# Patient Record
Sex: MALE | Race: WHITE | Employment: UNEMPLOYED | URBAN - METROPOLITAN AREA
[De-identification: names, ages, dates, MRNs, and addresses within clinical notes are randomized per-mention and may not be internally consistent; named-entity substitution may affect disease eponyms.]

---

## 2022-01-31 ENCOUNTER — HOSPITAL ENCOUNTER (OUTPATIENT)
Dept: WOUND CARE | Age: 47
Discharge: HOME | End: 2022-01-31
Payer: COMMERCIAL

## 2022-01-31 VITALS
SYSTOLIC BLOOD PRESSURE: 135 MMHG | TEMPERATURE: 96.4 F | DIASTOLIC BLOOD PRESSURE: 88 MMHG | RESPIRATION RATE: 16 BRPM | HEART RATE: 80 BPM

## 2022-01-31 DIAGNOSIS — L97.512 RIGHT FOOT ULCER, WITH FAT LAYER EXPOSED (HCC): Primary | ICD-10-CM

## 2022-01-31 PROCEDURE — 11042 DBRDMT SUBQ TIS 1ST 20SQCM/<: CPT

## 2022-01-31 RX ORDER — LIDOCAINE HYDROCHLORIDE 20 MG/ML
JELLY TOPICAL ONCE
Status: CANCELLED | OUTPATIENT
Start: 2022-01-31 | End: 2022-01-31

## 2022-01-31 RX ORDER — LIDOCAINE HYDROCHLORIDE 40 MG/ML
SOLUTION TOPICAL ONCE
Status: CANCELLED | OUTPATIENT
Start: 2022-01-31 | End: 2022-01-31

## 2022-01-31 RX ORDER — GENTAMICIN SULFATE 1 MG/G
OINTMENT TOPICAL ONCE
Status: CANCELLED | OUTPATIENT
Start: 2022-01-31 | End: 2022-01-31

## 2022-01-31 RX ORDER — CLOBETASOL PROPIONATE 0.5 MG/G
OINTMENT TOPICAL ONCE
Status: CANCELLED | OUTPATIENT
Start: 2022-01-31 | End: 2022-01-31

## 2022-01-31 RX ORDER — GINSENG 100 MG
CAPSULE ORAL ONCE
Status: CANCELLED | OUTPATIENT
Start: 2022-01-31 | End: 2022-01-31

## 2022-01-31 RX ORDER — LIDOCAINE 40 MG/G
CREAM TOPICAL ONCE
Status: CANCELLED | OUTPATIENT
Start: 2022-01-31 | End: 2022-01-31

## 2022-01-31 RX ORDER — LIDOCAINE 50 MG/G
OINTMENT TOPICAL ONCE
Status: CANCELLED | OUTPATIENT
Start: 2022-01-31 | End: 2022-01-31

## 2022-01-31 RX ORDER — EMPAGLIFLOZIN 25 MG/1
25 TABLET, FILM COATED ORAL DAILY
COMMUNITY

## 2022-01-31 RX ORDER — LIDOCAINE 40 MG/G
CREAM TOPICAL ONCE
Status: DISCONTINUED | OUTPATIENT
Start: 2022-01-31 | End: 2022-02-01 | Stop reason: HOSPADM

## 2022-01-31 RX ORDER — BETAMETHASONE DIPROPIONATE 0.05 %
OINTMENT (GRAM) TOPICAL ONCE
Status: CANCELLED | OUTPATIENT
Start: 2022-01-31 | End: 2022-01-31

## 2022-01-31 RX ORDER — BACITRACIN ZINC AND POLYMYXIN B SULFATE 500; 1000 [USP'U]/G; [USP'U]/G
OINTMENT TOPICAL ONCE
Status: CANCELLED | OUTPATIENT
Start: 2022-01-31 | End: 2022-01-31

## 2022-01-31 RX ORDER — INSULIN DETEMIR 100 [IU]/ML
INJECTION, SOLUTION SUBCUTANEOUS NIGHTLY
COMMUNITY

## 2022-01-31 RX ORDER — BACITRACIN, NEOMYCIN, POLYMYXIN B 400; 3.5; 5 [USP'U]/G; MG/G; [USP'U]/G
OINTMENT TOPICAL ONCE
Status: CANCELLED | OUTPATIENT
Start: 2022-01-31 | End: 2022-01-31

## 2022-01-31 NOTE — PLAN OF CARE
Discharge instructions given. Patient verbalized understanding. Return to [facility] in 2 week(s).   Called/faxed orders to [facility]

## 2022-01-31 NOTE — PROGRESS NOTES
Javi Mike  Progress Note and Procedure Note      Henri Clements  AGE: 42 y.o. GENDER: male  : 1975  TODAY'S DATE:  2022    Subjective:     Chief Complaint   Patient presents with    Wound Check     Right foot wound - diabetic - 1 month         HISTORY of PRESENT ILLNESS HPI     Henri Clements is a 42 y.o. male who presents today for wound evaluation. History of Wound: R foot ulcer. Pt has charcot foot with mid arch collapse. He was doing well ultil he had to run after dog barefoot and then wound started.    Wound Pain:  none  Severity:  0 / 10   Wound Type:  diabetic  Modifying Factors:  edema, diabetes, chronic pressure, shear force and obesity  Associated Signs/Symptoms:  edema and drainage        PAST MEDICAL HISTORY        Diagnosis Date    Diabetes mellitus ([address]     Hypertension     Obesity     SVT (supraventricular tachycardia) (HCC)     WPW syndrome        PAST SURGICAL HISTORY    Past Surgical History:   Procedure Laterality Date    ABLATION OF DYSRHYTHMIC FOCUS  12    posterior, septal accessory pathway    FOOT SURGERY      BILAT INGROWN TOENAILS       FAMILY HISTORY    Family History   Problem Relation Age of Onset    High Blood Pressure Mother     Cancer Maternal Grandmother         bone       SOCIAL HISTORY    Social History     Tobacco Use    Smoking status: Former Smoker     Packs/day: 1.00     Years: 15.00     Pack years: 15.00    Smokeless tobacco: Former User     Quit date: 3/8/2012   Substance Use Topics    Alcohol use: No    Drug use: No       ALLERGIES    Allergies   Allergen Reactions    Lactose      Lactose intolerance       MEDICATIONS    Current Outpatient Medications on File Prior to Encounter   Medication Sig Dispense Refill    insulin detemir (LEVEMIR) 100 UNIT/ML injection vial Inject into the skin nightly      empagliflozin (JARDIANCE) 25 MG tablet Take 25 mg by mouth daily      lisinopril (PRINIVIL;ZESTRIL) 40 MG tablet Take 1 tablet by mouth daily. 90 tablet 1    metoprolol (LOPRESSOR) 50 MG tablet Take 1 tablet by mouth 2 times daily. 180 tablet 1    simvastatin (ZOCOR) 20 MG tablet Take 1 tablet by mouth nightly. 90 tablet 1    aspirin 81 MG chewable tablet Take 1 tablet by mouth daily. 30 tablet 0     No current facility-administered medications on file prior to encounter. REVIEW OF SYSTEMS    Pertinent items are noted in HPI. Objective:      /88   Pulse 80   Temp 96.4 °F (35.8 °C)   Resp 16     PHYSICAL EXAM    General Appearance: alert and oriented to person, place and time, well-developed and well-nourished, in no acute distress  Skin: warm and dry, no rash or erythema and ulcer plantar R foot granular and no probe to bone. Callused edges. Cardiovascular: normal rate and intact distal pulses  Extremities: no cyanosis, no clubbing and edema noted to R foot. Musculoskeletal: no tender joints, no crepitus, no trigger point or muscular tenderness and charcot foot with mid foot collapse noted R foot. L hallux amputation noted. Neurologic: gait and coordination normal, speech normal and decreased sensation b/l LE. Assessment:     Patient Active Problem List   Diagnosis    WPW (Agatha-Parkinson-White syndrome)    Morbid obesity ([address]    Polycythemia    Diabetes mellitus [facility]    Hypertension       Procedure Note    Performed by: Brando Florez DPM    Consent obtained: Yes    Time out taken:  Yes    Pain Control: Anesthetic  Anesthetic: 4% Lidocaine Cream     Debridement:Excisional Debridement    Using curette the wound was sharply debrided    down through and including the removal of epidermis, dermis and subcutaneous tissue.         Devitalized Tissue Debrided:  fibrin, biofilm, slough and callus    Pre Debridement Measurements:  Are located in the Wound Documentation Flow Sheet    Wound #: 1     Post  Debridement Measurements:  Incision 04/17/12 Groin Right (Active)   Number of days: 2696 Incision 04/17/12 Groin Left (Active)   Number of days: 3183       Wound 01/31/22 Foot Right;Plantar #1 (Active)   Wound Image   01/31/22 1452   Wound Etiology Diabetic 01/31/22 1452   Wound Cleansed Cleansed with saline 01/31/22 1504   Offloading for Diabetic Foot Ulcers Diabetic shoes/inserts 01/31/22 1452   Wound Length (cm) 3.4 cm 01/31/22 1452   Wound Width (cm) 2.1 cm 01/31/22 1452   Wound Depth (cm) 0.1 cm 01/31/22 1452   Wound Surface Area (cm^2) 7.14 cm^2 01/31/22 1452   Wound Volume (cm^3) 0.714 cm^3 01/31/22 1452   Post-Procedure Length (cm) 3.4 cm 01/31/22 1504   Post-Procedure Width (cm) 2 cm 01/31/22 1504   Post-Procedure Depth (cm) 2.1 cm 01/31/22 1504   Post-Procedure Surface Area (cm^2) 6.8 cm^2 01/31/22 1504   Post-Procedure Volume (cm^3) 14.28 cm^3 01/31/22 1504   Wound Assessment Bleeding 01/31/22 1504   Drainage Amount Moderate 01/31/22 1504   Drainage Description Serosanguinous 01/31/22 1452   Odor None 01/31/22 1452   Santa-wound Assessment Intact 01/31/22 1452   Margins Attached edges; Defined edges 01/31/22 1452   Number of days: 0           Total Surface Area Debrided:  7.14 sq cm     Percentage of wound debrided 100%    Bleeding:  Minimal    Hemostasis Achieved:  by pressure    Procedural Pain:  0  / 10     Post Procedural Pain:  0 / 10     Response to treatment:  Well tolerated by patient. Plan:     The nature of the patient's condition was explained in depth. The patient was informed that their compliance to the treatment plan is paramount to successful healing and prevention of further ulceration and/or infection     Discharge Treatment       Pt examined and evaluated  Wound debrided full thickness with curette. Dress with collagen and DSD  Discussed importance of offloading and avoiding barefoot walking. F/u 2 weeks   Pending progress discuss TCC or grafting in future.      Written Patient Discharge Instructions Given            Electronically signed by Bhavesh Velazquez

## 2022-01-31 NOTE — PROGRESS NOTES
[address] [facility],[address]:      [address] f: [phone] f: [phone] p: [phone] [email or web address]     Ordering Center: Sarai Morgan [address]  [facility] [address]  [phone]  Dept: [phone]   Fax# [phone]    Patient Information:      Hari Sidhu  [address] [facility] [address]   [phone]   : 1975  AGE: 42 y.o. GENDER: male   TODAYS DATE:  2022    Insurance:      PRIMARY INSURANCE:  Plan: BCBS - [place] PPO  Coverage: BCBS  Effective Date: 2022  Group Number: [unfilled]  Subscriber Number: [id number] - (BX Traditional)    Payor/Plan Subscr  Sex Relation Sub. Ins. ID Effective Group Num   1.  4002 Gemini Stephenson -* DARRENROCIO 1975 Male Self [id number] 22 [id number]                                   [address]         Patient Wound Information:     Additional ICD-10 Codes:     Patient Active Problem List   Diagnosis Code    WPW (Agatha-Parkinson-White syndrome) I45.6    Morbid obesity ([facility] E66.01    Polycythemia D75.1    Diabetes mellitus ([facility] E11.9    Hypertension I10       WOUNDS REQUIRING DRESSING SUPPLIES:     Incision 12 Groin Right (Active)   Number of days: 4395       Incision 12 Groin Left (Active)   Number of days: 0590       Wound 22 Foot Right;Plantar #1 (Active)   Wound Image   22 1452   Wound Etiology Diabetic 22 1452   Wound Cleansed Cleansed with saline 22 1504   Offloading for Diabetic Foot Ulcers Diabetic shoes/inserts 22 1452   Wound Length (cm) 3.4 cm 22 1452   Wound Width (cm) 2.1 cm 22 1452   Wound Depth (cm) 0.1 cm 22 1452   Wound Surface Area (cm^2) 7.14 cm^2 22 1452   Wound Volume (cm^3) 0.714 cm^3 22 1452   Post-Procedure Length (cm) 3.4 cm 22 1504   Post-Procedure Width (cm) 2 cm 22 1504   Post-Procedure Depth (cm) 2.1 cm 22 1504   Post-Procedure Surface Area (cm^2) 6.8 cm^2 01/31/22 1504   Post-Procedure Volume (cm^3) 14.28 cm^3 01/31/22 1504   Wound Assessment Bleeding 01/31/22 1504   Drainage Amount Moderate 01/31/22 1504   Drainage Description Serosanguinous 01/31/22 1452   Odor None 01/31/22 1452   Santa-wound Assessment Intact 01/31/22 1452   Margins Attached edges; Defined edges 01/31/22 1452   Number of days: 0          Supplies Requested :      WOUND #: 1   PRIMARY DRESSING:    Other: Gigi Colonel and Secure with: 4X4 non woven gauze pad     FREQUENCY OF DRESSING CHANGES:  Daily    Wound Thickness [x] Full   []Partial       Patient Wound(s) Debrided: [x] Yes   [] No    Debridement Date: 1/31/2022    Debribement Type: Excisional/Sharp    ADDITIONAL ITEMS:  [] Gloves Small  [x] Gloves Medium [] Gloves Large [] Gloves [place] Chess  [] Paper Tape 1\" [] Paper Tape 2\" [] Paper Tape 3\"  [x] Medipore Tape 3\"  [x] Saline  [] Skin Prep   [] Adhesive Remover   [] Cotton Tip Applicators  [] Tubular Stocking   [] Size E  [] Size G  [] Other:    Patient currently being seen by Home Health: [] Yes   [x] No    Duration for needed supplies:  [x]15  []30  []60  []90 Days    Provider Information:      PROVIDER'S NAME/NPI  Mary Allen DPM NPI: [id number]   I give permission to coordinate the care for this patient

## 2022-02-14 ENCOUNTER — HOSPITAL ENCOUNTER (OUTPATIENT)
Dept: WOUND CARE | Age: 47
Discharge: HOME | End: 2022-02-14
Payer: COMMERCIAL

## 2022-02-14 VITALS — SYSTOLIC BLOOD PRESSURE: 130 MMHG | HEART RATE: 87 BPM | RESPIRATION RATE: 16 BRPM | DIASTOLIC BLOOD PRESSURE: 87 MMHG

## 2022-02-14 DIAGNOSIS — L97.512 RIGHT FOOT ULCER, WITH FAT LAYER EXPOSED (HCC): Primary | ICD-10-CM

## 2022-02-14 PROCEDURE — 11042 DBRDMT SUBQ TIS 1ST 20SQCM/<: CPT

## 2022-02-14 RX ORDER — GENTAMICIN SULFATE 1 MG/G
OINTMENT TOPICAL ONCE
Status: CANCELLED | OUTPATIENT
Start: 2022-02-14 | End: 2022-02-14

## 2022-02-14 RX ORDER — BACITRACIN ZINC AND POLYMYXIN B SULFATE 500; 1000 [USP'U]/G; [USP'U]/G
OINTMENT TOPICAL ONCE
Status: CANCELLED | OUTPATIENT
Start: 2022-02-14 | End: 2022-02-14

## 2022-02-14 RX ORDER — LIDOCAINE HYDROCHLORIDE 20 MG/ML
JELLY TOPICAL ONCE
Status: CANCELLED | OUTPATIENT
Start: 2022-02-14 | End: 2022-02-14

## 2022-02-14 RX ORDER — CLOBETASOL PROPIONATE 0.5 MG/G
OINTMENT TOPICAL ONCE
Status: CANCELLED | OUTPATIENT
Start: 2022-02-14 | End: 2022-02-14

## 2022-02-14 RX ORDER — BACITRACIN, NEOMYCIN, POLYMYXIN B 400; 3.5; 5 [USP'U]/G; MG/G; [USP'U]/G
OINTMENT TOPICAL ONCE
Status: CANCELLED | OUTPATIENT
Start: 2022-02-14 | End: 2022-02-14

## 2022-02-14 RX ORDER — LIDOCAINE 50 MG/G
OINTMENT TOPICAL ONCE
Status: CANCELLED | OUTPATIENT
Start: 2022-02-14 | End: 2022-02-14

## 2022-02-14 RX ORDER — BETAMETHASONE DIPROPIONATE 0.05 %
OINTMENT (GRAM) TOPICAL ONCE
Status: CANCELLED | OUTPATIENT
Start: 2022-02-14 | End: 2022-02-14

## 2022-02-14 RX ORDER — GINSENG 100 MG
CAPSULE ORAL ONCE
Status: CANCELLED | OUTPATIENT
Start: 2022-02-14 | End: 2022-02-14

## 2022-02-14 RX ORDER — LIDOCAINE 40 MG/G
CREAM TOPICAL ONCE
Status: DISCONTINUED | OUTPATIENT
Start: 2022-02-14 | End: 2022-02-15 | Stop reason: HOSPADM

## 2022-02-14 RX ORDER — LIDOCAINE 40 MG/G
CREAM TOPICAL ONCE
Status: CANCELLED | OUTPATIENT
Start: 2022-02-14 | End: 2022-02-14

## 2022-02-14 RX ORDER — LIDOCAINE HYDROCHLORIDE 40 MG/ML
SOLUTION TOPICAL ONCE
Status: CANCELLED | OUTPATIENT
Start: 2022-02-14 | End: 2022-02-14

## 2022-02-14 NOTE — PLAN OF CARE
Discharge instructions given. Patient verbalized understanding. Return to [address] [place] [address],[address] in 1 week(s).

## 2022-02-14 NOTE — PROGRESS NOTES
Javi Mike  Progress Note and Procedure Note      Krishna Francisco  AGE: 42 y.o. GENDER: male  : 1975  TODAY'S DATE:  2022    Subjective:     Chief Complaint   Patient presents with    Wound Check     Follow up right foot wound         HISTORY of PRESENT ILLNESS HPI     Krishna Francisco is a 42 y.o. male who presents today for wound evaluation. History of Wound: R foot ulcer. Pt has charcot foot with mid arch collapse. He was doing well ultil he had to run after dog barefoot and then wound started. Pt was unable to get collagen past 2 weeks.    Wound Pain:  none  Severity:  0 / 10   Wound Type:  diabetic  Modifying Factors:  edema, diabetes, chronic pressure and shear force  Associated Signs/Symptoms:  edema and drainage        PAST MEDICAL HISTORY        Diagnosis Date    Diabetes mellitus ([address]     Hypertension     Obesity     SVT (supraventricular tachycardia) (HCC)     WPW syndrome        PAST SURGICAL HISTORY    Past Surgical History:   Procedure Laterality Date    ABLATION OF DYSRHYTHMIC FOCUS  12    posterior, septal accessory pathway    FOOT SURGERY      BILAT INGROWN TOENAILS       FAMILY HISTORY    Family History   Problem Relation Age of Onset    High Blood Pressure Mother     Cancer Maternal Grandmother         bone       SOCIAL HISTORY    Social History     Tobacco Use    Smoking status: Former Smoker     Packs/day: 1.00     Years: 15.00     Pack years: 15.00    Smokeless tobacco: Former User     Quit date: 3/8/2012   Substance Use Topics    Alcohol use: No    Drug use: No       ALLERGIES    Allergies   Allergen Reactions    Lactose      Lactose intolerance       MEDICATIONS    Current Outpatient Medications on File Prior to Encounter   Medication Sig Dispense Refill    insulin detemir (LEVEMIR) 100 UNIT/ML injection vial Inject into the skin nightly      empagliflozin (JARDIANCE) 25 MG tablet Take 25 mg by mouth daily      lisinopril (PRINIVIL;ZESTRIL) 40 MG tablet Take 1 tablet by mouth daily. 90 tablet 1    metoprolol (LOPRESSOR) 50 MG tablet Take 1 tablet by mouth 2 times daily. 180 tablet 1    simvastatin (ZOCOR) 20 MG tablet Take 1 tablet by mouth nightly. 90 tablet 1    aspirin 81 MG chewable tablet Take 1 tablet by mouth daily. 30 tablet 0     No current facility-administered medications on file prior to encounter. REVIEW OF SYSTEMS    Pertinent items are noted in HPI. Objective:      /87   Pulse 87   Resp 16     PHYSICAL EXAM    General Appearance: alert and oriented to person, place and time, well-developed and well-nourished, in no acute distress  Skin: warm and dry, no rash or erythema and ulcer plantar R foot granular and no probe to bone. Callused edges. Cardiovascular: normal rate and intact distal pulses  Extremities: no cyanosis, no clubbing and edema noted to R foot. Musculoskeletal: no tender joints, no crepitus, no trigger point or muscular tenderness and charcot foot with mid foot collapse noted R foot. L hallux amputation noted. Neurologic: gait and coordination normal, speech normal and decreased sensation b/l LE. Assessment:     Patient Active Problem List   Diagnosis    WPW (Agatha-Parkinson-White syndrome)    Morbid obesity ([address]    Polycythemia    Diabetes mellitus ([address]    Hypertension    Right foot ulcer, with fat layer exposed ([facility]       Procedure Note    Performed by: Nicanor Hernandez DPM    Consent obtained: Yes    Time out taken:  Yes    Pain Control: Anesthetic  Anesthetic: 4% Lidocaine Cream     Debridement:Excisional Debridement    Using curette the wound was sharply debrided    down through and including the removal of epidermis, dermis and subcutaneous tissue.         Devitalized Tissue Debrided:  fibrin, biofilm, slough and callus    Pre Debridement Measurements:  Are located in the Wound Documentation Flow Sheet    Wound #: 1     Post  Debridement Measurements:  Wound 01/31/22 Foot Right;Plantar #1 (Active)   Wound Image   01/31/22 1452   Wound Etiology Diabetic 02/14/22 1525   Wound Cleansed Cleansed with saline 02/14/22 1525   Dressing/Treatment Dry dressing;Triad hydro/zinc oxide-based hydrophilic paste; Other (comment) 01/31/22 1504   Offloading for Diabetic Foot Ulcers Diabetic shoes/inserts 02/14/22 1525   Wound Length (cm) 2 cm 02/14/22 1525   Wound Width (cm) 1.3 cm 02/14/22 1525   Wound Depth (cm) 0.1 cm 02/14/22 1525   Wound Surface Area (cm^2) 2.6 cm^2 02/14/22 1525   Change in Wound Size % (l*w) 63.59 02/14/22 1525   Wound Volume (cm^3) 0.26 cm^3 02/14/22 1525   Wound Healing % 64 02/14/22 1525   Post-Procedure Length (cm) 2 cm 02/14/22 1531   Post-Procedure Width (cm) 1.3 cm 02/14/22 1531   Post-Procedure Depth (cm) 0.1 cm 02/14/22 1531   Post-Procedure Surface Area (cm^2) 2.6 cm^2 02/14/22 1531   Post-Procedure Volume (cm^3) 0.26 cm^3 02/14/22 1531   Wound Assessment Bleeding 02/14/22 1531   Drainage Amount Moderate 02/14/22 1531   Drainage Description Serosanguinous 02/14/22 1525   Odor None 02/14/22 1525   Santa-wound Assessment Intact 02/14/22 1525   Margins Attached edges; Defined edges 02/14/22 1525   Number of days: 14           Total Surface Area Debrided:  2.6 sq cm     Percentage of wound debrided 100%    Bleeding:  Minimal    Hemostasis Achieved:  by pressure    Procedural Pain:  0  / 10     Post Procedural Pain:  0 / 10     Response to treatment:  Well tolerated by patient. Plan:     The nature of the patient's condition was explained in depth. The patient was informed that their compliance to the treatment plan is paramount to successful healing and prevention of further ulceration and/or infection     Discharge Treatment       Pt examined and evaluated  Wound debrided full thickness with curette. Dress with collagen and DSD  Discussed importance of offloading and avoiding barefoot walking.    F/u 2 weeks   Pending progress discuss TCC or grafting in future.      Written Patient Discharge Instructions Given            Electronically signed by Isaiah Raya DPM on 2/14/2022 at 3:32 PM

## 2022-02-28 ENCOUNTER — HOSPITAL ENCOUNTER (OUTPATIENT)
Dept: WOUND CARE | Age: 47
Discharge: HOME | End: 2022-02-28
Payer: COMMERCIAL

## 2022-02-28 VITALS — DIASTOLIC BLOOD PRESSURE: 94 MMHG | SYSTOLIC BLOOD PRESSURE: 149 MMHG | RESPIRATION RATE: 16 BRPM | HEART RATE: 76 BPM

## 2022-02-28 DIAGNOSIS — L97.512 RIGHT FOOT ULCER, WITH FAT LAYER EXPOSED (HCC): Primary | ICD-10-CM

## 2022-02-28 PROCEDURE — 11042 DBRDMT SUBQ TIS 1ST 20SQCM/<: CPT

## 2022-02-28 RX ORDER — LIDOCAINE HYDROCHLORIDE 40 MG/ML
SOLUTION TOPICAL ONCE
OUTPATIENT
Start: 2022-02-28 | End: 2022-02-28

## 2022-02-28 RX ORDER — BACITRACIN, NEOMYCIN, POLYMYXIN B 400; 3.5; 5 [USP'U]/G; MG/G; [USP'U]/G
OINTMENT TOPICAL ONCE
OUTPATIENT
Start: 2022-02-28 | End: 2022-02-28

## 2022-02-28 RX ORDER — CLOBETASOL PROPIONATE 0.5 MG/G
OINTMENT TOPICAL ONCE
OUTPATIENT
Start: 2022-02-28 | End: 2022-02-28

## 2022-02-28 RX ORDER — LIDOCAINE 50 MG/G
OINTMENT TOPICAL ONCE
OUTPATIENT
Start: 2022-02-28 | End: 2022-02-28

## 2022-02-28 RX ORDER — LIDOCAINE HYDROCHLORIDE 20 MG/ML
JELLY TOPICAL ONCE
OUTPATIENT
Start: 2022-02-28 | End: 2022-02-28

## 2022-02-28 RX ORDER — LIDOCAINE 40 MG/G
CREAM TOPICAL ONCE
Status: CANCELLED | OUTPATIENT
Start: 2022-02-28 | End: 2022-02-28

## 2022-02-28 RX ORDER — GINSENG 100 MG
CAPSULE ORAL ONCE
OUTPATIENT
Start: 2022-02-28 | End: 2022-02-28

## 2022-02-28 RX ORDER — BACITRACIN ZINC AND POLYMYXIN B SULFATE 500; 1000 [USP'U]/G; [USP'U]/G
OINTMENT TOPICAL ONCE
OUTPATIENT
Start: 2022-02-28 | End: 2022-02-28

## 2022-02-28 RX ORDER — BETAMETHASONE DIPROPIONATE 0.05 %
OINTMENT (GRAM) TOPICAL ONCE
OUTPATIENT
Start: 2022-02-28 | End: 2022-02-28

## 2022-02-28 RX ORDER — GENTAMICIN SULFATE 1 MG/G
OINTMENT TOPICAL ONCE
OUTPATIENT
Start: 2022-02-28 | End: 2022-02-28

## 2022-02-28 RX ORDER — LIDOCAINE 40 MG/G
CREAM TOPICAL ONCE
Status: DISCONTINUED | OUTPATIENT
Start: 2022-02-28 | End: 2022-03-01 | Stop reason: HOSPADM

## 2022-02-28 NOTE — PLAN OF CARE
Discharge instructions given. Patient verbalized understanding. Return to [address] [place] [address],[address] in 2 week(s). Called/faxed orders to [facility].

## 2022-02-28 NOTE — PLAN OF CARE
[address] [facility],[address]:      [address] f: [phone] f: [phone] p: [phone] [email or web address]     Ordering Center: Sarai Morgan [address]  [facility] [address]  [phone]  Dept: [phone]   Fax# [phone]    Patient Information:      Edith Larkin [facility] [address]   [phone]   : 1975  AGE: 42 y.o. GENDER: male   TODAYS DATE:  2022    Insurance:      PRIMARY INSURANCE:  Plan: BCBS - [place] PPO  Coverage: BCBS  Effective Date: 2022  Group Number: [unfilled]  Subscriber Number: [id number] - (BX Traditional)    Payor/Plan Subscr  Sex Relation Sub. Ins. ID Effective Group Num   1. 4002 Gemini Stephenson -* ROCIO BANKS 1975 Male Self [id number] 22 [id number]                                   [address]         Patient Wound Information:     Additional ICD-10 Codes:     Patient Active Problem List   Diagnosis Code    WPW (Agatha-Parkinson-White syndrome) I45.6    Morbid obesity ([address] E66.01    Polycythemia D75.1    Diabetes mellitus ([address] E11.9    Hypertension I10    Right foot ulcer, with fat layer exposed ([address] L97.512       WOUNDS REQUIRING DRESSING SUPPLIES:     Wound 22 Foot Right;Plantar #1 (Active)   Wound Image   22 1452   Wound Etiology Diabetic 22 1450   Wound Cleansed Cleansed with saline 22 1450   Dressing/Treatment Dry dressing;Triad hydro/zinc oxide-based hydrophilic paste; Other (comment) 22 1504   Offloading for Diabetic Foot Ulcers Diabetic shoes/inserts 22 1450   Wound Length (cm) 3 cm 22 1450   Wound Width (cm) 2.4 cm 22 1450   Wound Depth (cm) 0.1 cm 22 1450   Wound Surface Area (cm^2) 7.2 cm^2 22 1450   Change in Wound Size % (l*w) -0.84 22 1450   Wound Volume (cm^3) 0.72 cm^3 22 1450   Wound Healing % -1 22 1450   Post-Procedure Length (cm) 3 cm 22 1512 Post-Procedure Width (cm) 2.4 cm 02/28/22 1512   Post-Procedure Depth (cm) 0.1 cm 02/28/22 1512   Post-Procedure Surface Area (cm^2) 7.2 cm^2 02/28/22 1512   Post-Procedure Volume (cm^3) 0.72 cm^3 02/28/22 1512   Wound Assessment Bleeding 02/28/22 1512   Drainage Amount Moderate 02/28/22 1512   Drainage Description Serosanguinous 02/28/22 1450   Odor None 02/28/22 1450   Santa-wound Assessment Hyperkeratosis (callous) 02/28/22 1450   Margins Attached edges; Defined edges 02/28/22 1450   Number of days: 28          Supplies Requested :      WOUND #: 1   PRIMARY DRESSING:    Collagen    Cover and Secure with: Gauze pad     FREQUENCY OF DRESSING CHANGES:  Daily    Wound Thickness [x] Full   []Partial         Patient Wound(s) Debrided: [] Yes   [] No    Debridement Date: 2/28/2022    Debribement Type: Excisional/Sharp    ADDITIONAL ITEMS:  [] Gloves Small  [x] Gloves Medium [x] Gloves Large [] Gloves Nicole Sindy  [] Paper Tape 1\" [] Paper Tape 2\" [] Paper Tape 3\"  [x] Medipore Tape 3\"  [x] Saline  [] Skin Prep   [] Adhesive Remover   [] Cotton Tip Applicators  [] Tubular Stocking   [] Size E  [] Size G  [] Other:    Patient currently being seen by Home Health: [] Yes   [x] No    Duration for needed supplies:  []15  [x]30  []60  []90 Days    Provider Information:      PROVIDER'S NAME/NPI  Trish Tracey DPM NPI: [id number]    I give permission to coordinate the care for this patient

## 2022-03-14 ENCOUNTER — HOSPITAL ENCOUNTER (OUTPATIENT)
Dept: WOUND CARE | Age: 47
Discharge: HOME | End: 2022-03-14
Payer: COMMERCIAL

## 2022-03-14 VITALS
RESPIRATION RATE: 16 BRPM | SYSTOLIC BLOOD PRESSURE: 141 MMHG | TEMPERATURE: 97.3 F | HEART RATE: 89 BPM | DIASTOLIC BLOOD PRESSURE: 83 MMHG

## 2022-03-14 DIAGNOSIS — L97.512 RIGHT FOOT ULCER, WITH FAT LAYER EXPOSED (HCC): Primary | ICD-10-CM

## 2022-03-14 PROCEDURE — 11042 DBRDMT SUBQ TIS 1ST 20SQCM/<: CPT

## 2022-03-14 RX ORDER — GINSENG 100 MG
CAPSULE ORAL ONCE
OUTPATIENT
Start: 2022-03-14 | End: 2022-03-14

## 2022-03-14 RX ORDER — LIDOCAINE HYDROCHLORIDE 20 MG/ML
JELLY TOPICAL ONCE
OUTPATIENT
Start: 2022-03-14 | End: 2022-03-14

## 2022-03-14 RX ORDER — BETAMETHASONE DIPROPIONATE 0.05 %
OINTMENT (GRAM) TOPICAL ONCE
OUTPATIENT
Start: 2022-03-14 | End: 2022-03-14

## 2022-03-14 RX ORDER — LIDOCAINE HYDROCHLORIDE 40 MG/ML
SOLUTION TOPICAL ONCE
OUTPATIENT
Start: 2022-03-14 | End: 2022-03-14

## 2022-03-14 RX ORDER — GENTAMICIN SULFATE 1 MG/G
OINTMENT TOPICAL ONCE
OUTPATIENT
Start: 2022-03-14 | End: 2022-03-14

## 2022-03-14 RX ORDER — LIDOCAINE 50 MG/G
OINTMENT TOPICAL ONCE
OUTPATIENT
Start: 2022-03-14 | End: 2022-03-14

## 2022-03-14 RX ORDER — BACITRACIN ZINC AND POLYMYXIN B SULFATE 500; 1000 [USP'U]/G; [USP'U]/G
OINTMENT TOPICAL ONCE
OUTPATIENT
Start: 2022-03-14 | End: 2022-03-14

## 2022-03-14 RX ORDER — CLOBETASOL PROPIONATE 0.5 MG/G
OINTMENT TOPICAL ONCE
OUTPATIENT
Start: 2022-03-14 | End: 2022-03-14

## 2022-03-14 RX ORDER — LIDOCAINE 40 MG/G
CREAM TOPICAL ONCE
Status: DISCONTINUED | OUTPATIENT
Start: 2022-03-14 | End: 2022-03-15 | Stop reason: HOSPADM

## 2022-03-14 RX ORDER — BACITRACIN, NEOMYCIN, POLYMYXIN B 400; 3.5; 5 [USP'U]/G; MG/G; [USP'U]/G
OINTMENT TOPICAL ONCE
OUTPATIENT
Start: 2022-03-14 | End: 2022-03-14

## 2022-03-14 RX ORDER — LIDOCAINE 40 MG/G
CREAM TOPICAL ONCE
OUTPATIENT
Start: 2022-03-14 | End: 2022-03-14

## 2022-03-14 RX ORDER — DULAGLUTIDE 1.5 MG/.5ML
1.5 INJECTION, SOLUTION SUBCUTANEOUS WEEKLY
COMMUNITY

## 2022-03-14 NOTE — PROGRESS NOTES
Javi   Progress Note and Procedure Note      Thedford Dubin  AGE: 42 y.o. GENDER: male  : 1975  TODAY'S DATE:  3/14/2022    Subjective:     Chief Complaint   Patient presents with    Wound Check     Right foot         HISTORY of PRESENT ILLNESS HPI     Thedford Dubin is a 42 y.o. male who presents today for wound evaluation. History of Wound: R foot ulcer  Wound Pain:  none  Severity:  0 / 10   Wound Type:  diabetic  Modifying Factors:  edema, diabetes, chronic pressure and shear force  Associated Signs/Symptoms:  edema and drainage        PAST MEDICAL HISTORY        Diagnosis Date    [facility]     Hypertension     Obesity     SVT (supraventricular tachycardia) ([facility]     WPW syndrome        PAST SURGICAL HISTORY    Past Surgical History:   Procedure Laterality Date    ABLATION OF DYSRHYTHMIC FOCUS  12    posterior, septal accessory pathway    FOOT SURGERY      BILAT INGROWN TOENAILS       FAMILY HISTORY    Family History   Problem Relation Age of Onset    High Blood Pressure Mother     Cancer Maternal Grandmother         bone       SOCIAL HISTORY    Social History     Tobacco Use    Smoking status: Former Smoker     Packs/day: 1.00     Years: 15.00     Pack years: 15.00    Smokeless tobacco: Former User     Quit date: 3/8/2012   Substance Use Topics    Alcohol use: No    Drug use: No       ALLERGIES    Allergies   Allergen Reactions    Lactose      Lactose intolerance       MEDICATIONS    Current Outpatient Medications on File Prior to Encounter   Medication Sig Dispense Refill    Dulaglutide (TRULICITY) 1.5 PD/8.6HK SOPN Inject 1.5 mg into the skin once a week      insulin detemir (LEVEMIR) 100 UNIT/ML injection vial Inject into the skin nightly      empagliflozin (JARDIANCE) 25 MG tablet Take 25 mg by mouth daily      lisinopril (PRINIVIL;ZESTRIL) 40 MG tablet Take 1 tablet by mouth daily.  90 tablet 1    metoprolol (LOPRESSOR) 50 MG tablet Take 1 tablet by mouth 2 times daily. 180 tablet 1    simvastatin (ZOCOR) 20 MG tablet Take 1 tablet by mouth nightly. 90 tablet 1    aspirin 81 MG chewable tablet Take 1 tablet by mouth daily. 30 tablet 0     No current facility-administered medications on file prior to encounter. REVIEW OF SYSTEMS    Pertinent items are noted in HPI. Objective:      BP (!) 141/83   Pulse 89   Temp 97.3 °F (36.3 °C) (Infrared)   Resp 16     PHYSICAL EXAM    General Appearance: alert and oriented to person, place and time, well-developed and well-nourished, in no acute distress  Skin: warm and dry, no rash or erythema and ulcer plantar R foot granular and no probe to bone. Callused edges.   Cardiovascular: normal rate and intact distal pulses  Extremities: no cyanosis, no clubbing and edema noted to R foot.   Musculoskeletal: no tender joints, no crepitus, no trigger point or muscular tenderness and charcot foot with mid foot collapse noted R foot. L hallux amputation noted.   Neurologic: gait and coordination normal, speech normal and decreased sensation b/l LE.       Assessment:     Patient Active Problem List   Diagnosis    WPW (Agatha-Parkinson-White syndrome)    Morbid obesity ([address]    Polycythemia    Diabetes mellitus ([facility]    Hypertension    Right foot ulcer, with fat layer exposed ([facility]       Procedure Note    Performed by: Edilberto Akins DPM    Consent obtained: Yes    Time out taken:  Yes    Pain Control: Anesthetic  Anesthetic: 4% Lidocaine Cream     Debridement:Excisional Debridement    Using curette the wound was sharply debrided    down through and including the removal of epidermis, dermis and subcutaneous tissue.         Devitalized Tissue Debrided:  fibrin, biofilm, slough and callus    Pre Debridement Measurements:  Are located in the Wound Documentation Flow Sheet    Wound #: 1     Post  Debridement Measurements:  Wound 01/31/22 Foot Right;Plantar #1 (Active)   Wound Image   01/31/22 1452   Wound Etiology Diabetic 03/14/22 1443   Wound Cleansed Cleansed with saline 03/14/22 1443   Dressing/Treatment Dry dressing;Triad hydro/zinc oxide-based hydrophilic paste; Other (comment) 01/31/22 1504   Offloading for Diabetic Foot Ulcers Diabetic shoes/inserts 03/14/22 1443   Wound Length (cm) 2.3 cm 03/14/22 1443   Wound Width (cm) 1.5 cm 03/14/22 1443   Wound Depth (cm) 0.1 cm 03/14/22 1443   Wound Surface Area (cm^2) 3.45 cm^2 03/14/22 1443   Change in Wound Size % (l*w) 51.68 03/14/22 1443   Wound Volume (cm^3) 0.345 cm^3 03/14/22 1443   Wound Healing % 52 03/14/22 1443   Post-Procedure Length (cm) 2.4 cm 03/14/22 1512   Post-Procedure Width (cm) 1.6 cm 03/14/22 1512   Post-Procedure Depth (cm) 0.1 cm 03/14/22 1512   Post-Procedure Surface Area (cm^2) 3.84 cm^2 03/14/22 1512   Post-Procedure Volume (cm^3) 0.384 cm^3 03/14/22 1512   Wound Assessment Bleeding 03/14/22 1512   Drainage Amount Moderate 03/14/22 1443   Drainage Description Serosanguinous 03/14/22 1443   Odor None 03/14/22 1443   Santa-wound Assessment Hyperkeratosis (callous) 03/14/22 1443   Margins Attached edges 03/14/22 1443   Number of days: 41           Total Surface Area Debrided:  3.45 sq cm     Percentage of wound debrided 100%    Bleeding:  Minimal    Hemostasis Achieved:  by pressure    Procedural Pain:  0  / 10     Post Procedural Pain:  0 / 10     Response to treatment:  Well tolerated by patient. Plan:     The nature of the patient's condition was explained in depth. The patient was informed that their compliance to the treatment plan is paramount to successful healing and prevention of further ulceration and/or infection     Discharge Treatment       Pt examined and evaluated  Wound debrided full thickness with curette. Dress with collagen and DSD  Discussed importance of offloading and avoiding barefoot walking. Offloaded with felt cut out. F/u 2 weeks   Pending progress discuss TCC or grafting in future.     Written Patient Discharge Instructions Given            Electronically signed by Freddy Mccurdy DPM on 3/14/2022 at 3:17 PM

## 2022-03-15 NOTE — PLAN OF CARE
[address] [facility]:      [address] f: [phone] f: [phone] p: [phone] [email or web address]     Ordering Center: Sarai Morgan [address]  [facility] [address]  [phone]  Dept: [phone]   Fax# [phone]    Patient Information:      Bridgette Leonard  [address] [facility] [address]   [phone]   : 1975  AGE: 42 y.o. GENDER: male   TODAYS DATE:  3/15/2022    Insurance:      PRIMARY INSURANCE:  Plan: BCBS - [place] PPO  Coverage: BCBS  Effective Date: 2022  Group Number: [unfilled]  Subscriber Number: [id number] - (BX Traditional)    Payor/Plan Subscr  Sex Relation Sub. Ins. ID Effective Group Num   1.  [id number] Gemini Stephenson -* ROCIO BANKS 1975 Male Self [id number]                                   [address]         Patient Wound Information:     Additional ICD-10 Codes:     Patient Active Problem List   Diagnosis Code    WPW (Agatha-Parkinson-White syndrome) I45.6    Morbid obesity ([address] E66.01    Polycythemia D75.1    Diabetes mellitus ([address] E11.9    Hypertension I10    Right foot ulcer, with fat layer exposed ([address] L97.512       WOUNDS REQUIRING DRESSING SUPPLIES:     Wound 22 Foot Right;Plantar #1 (Active)   Wound Image   22 1452   Wound Etiology Diabetic 22 1443   Wound Cleansed Cleansed with saline 22 1443   Dressing/Treatment Antibacterial ointment;Collagen;Dry dressing 22 1512   Offloading for Diabetic Foot Ulcers Diabetic shoes/inserts 22 1443   Wound Length (cm) 2.3 cm 22 1443   Wound Width (cm) 1.5 cm 22 1443   Wound Depth (cm) 0.1 cm 22 1443   Wound Surface Area (cm^2) 3.45 cm^2 22 1443   Change in Wound Size % (l*w) 51.68 22 1443   Wound Volume (cm^3) 0.345 cm^3 22 1443   Wound Healing % 52 22 1443   Post-Procedure Length (cm) 2.4 cm 22 1512   Post-Procedure Width (cm) 1.6 cm 03/14/22 1512   Post-Procedure Depth (cm) 0.1 cm 03/14/22 1512   Post-Procedure Surface Area (cm^2) 3.84 cm^2 03/14/22 1512   Post-Procedure Volume (cm^3) 0.384 cm^3 03/14/22 1512   Wound Assessment Bleeding 03/14/22 1512   Drainage Amount Moderate 03/14/22 1443   Drainage Description Serosanguinous 03/14/22 1443   Odor None 03/14/22 1443   Santa-wound Assessment Hyperkeratosis (callous) 03/14/22 1443   Margins Attached edges 03/14/22 1443   Number of days: 42          Supplies Requested :      WOUND #: 1   PRIMARY DRESSING:    Collagen    Cover and Secure with: 4X4 gauze pad     FREQUENCY OF DRESSING CHANGES:  Every other day    Wound Thickness [x] Full   []Partial       Patient Wound(s) Debrided: [] Yes   [] No    Debridement Date: 3/15/2022    Debribement Type: Excisional/Sharp    ADDITIONAL ITEMS:  [] Gloves Small  [] Gloves Medium [x] Gloves Large [] Gloves Ladell Sandra  [] Paper Tape 1\" [] Paper Tape 2\" [] Paper Tape 3\"  [x] Medipore Tape 3\"  [] Saline  [] Skin Prep   [] Adhesive Remover   [] Cotton Tip Applicators  [] Tubular Stocking   [] Size E  [] Size G  [] Other:    Patient currently being seen by Home Health: [] Yes   [x] No    Duration for needed supplies:  []15  [x]30  []60  []90 Days    Provider Information:      PROVIDER'S NAME/NPI  Hines Bosworth, DPM NPI: [id number]    I give permission to coordinate the care for this patient

## 2022-03-28 ENCOUNTER — HOSPITAL ENCOUNTER (OUTPATIENT)
Dept: WOUND CARE | Age: 47
Discharge: HOME | End: 2022-03-28

## 2022-12-05 ENCOUNTER — HOSPITAL ENCOUNTER (OUTPATIENT)
Age: 47
Setting detail: SPECIMEN
Discharge: HOME | End: 2022-12-05
Payer: COMMERCIAL

## 2022-12-05 LAB
ANION GAP SERPL CALCULATED.3IONS-SCNC: 12 MMOL/L (ref 3–16)
BUN BLDV-MCNC: 25 MG/DL (ref 7–20)
C-REACTIVE PROTEIN: 42.9 MG/L (ref 0–5.1)
CALCIUM SERPL-MCNC: 9.4 MG/DL (ref 8.3–10.6)
CHLORIDE BLD-SCNC: 106 MMOL/L (ref 99–110)
CO2: 23 MMOL/L (ref 21–32)
CREAT SERPL-MCNC: 0.9 MG/DL (ref 0.9–1.3)
GFR SERPL CREATININE-BSD FRML MDRD: >60 ML/MIN/{1.73_M2}
GLUCOSE BLD-MCNC: 186 MG/DL (ref 70–99)
HCT VFR BLD CALC: 32 % (ref 40.5–52.5)
HEMOGLOBIN: 10.3 G/DL (ref 13.5–17.5)
MCH RBC QN AUTO: 27.1 PG (ref 26–34)
MCHC RBC AUTO-ENTMCNC: 32.1 G/DL (ref 31–36)
MCV RBC AUTO: 84.6 FL (ref 80–100)
PDW BLD-RTO: 17.8 % (ref 12.4–15.4)
PHOSPHORUS: 3.4 MG/DL (ref 2.5–4.9)
PLATELET # BLD: 564 K/UL (ref 135–450)
PMV BLD AUTO: 7.1 FL (ref 5–10.5)
POTASSIUM SERPL-SCNC: 4.6 MMOL/L (ref 3.5–5.1)
RBC # BLD: 3.78 M/UL (ref 4.2–5.9)
SEDIMENTATION RATE, ERYTHROCYTE: 119 MM/HR (ref 0–15)
SODIUM BLD-SCNC: 141 MMOL/L (ref 136–145)
WBC # BLD: 8.9 K/UL (ref 4–11)

## 2022-12-05 PROCEDURE — 36415 COLL VENOUS BLD VENIPUNCTURE: CPT

## 2022-12-05 PROCEDURE — 85027 COMPLETE CBC AUTOMATED: CPT

## 2022-12-05 PROCEDURE — 85652 RBC SED RATE AUTOMATED: CPT

## 2022-12-05 PROCEDURE — 80048 BASIC METABOLIC PNL TOTAL CA: CPT

## 2022-12-05 PROCEDURE — 86140 C-REACTIVE PROTEIN: CPT

## 2022-12-05 PROCEDURE — 84100 ASSAY OF PHOSPHORUS: CPT

## 2022-12-29 NOTE — DISCHARGE INSTRUCTIONS
[facility], [address] [place] [address] Road  Telephone: [phone]    Discharge Instructions    Important reminders:    **If you have any signs and symptoms of illness (Cough, fever, congestion, nausea, vomiting, diarrhea, etc.) please call the wound care center prior to your appointment. 1. Increase Protein intake for optimal wound healing  2. No added salt to reduce any swelling  3. If diabetic, maintain good glucose control  4. If you smoke, smoking prohibits wound healing, we ask that you refrain from smoking. 5. When taking antibiotics take the entire prescription as ordered. Do not stop taking until medication is all gone unless otherwise instructed. 6. Exercise as tolerated. 7. Keep weight off wounds and reposition every 2 hours if applicable. 8. If wound(s) is on your lower extremity, elevate legs to the level of the heart or above for 30 minutes 4-5 times a day and/or when sitting. Avoid standing for long periods of time. 9. Do not get wounds wet in bath or shower unless otherwise instructed by your physician. If your wound is on your foot or leg, you may purchase a cast bag. Please ask at the pharmacy. If Vascular testing is ordered, please call [address] [place] [address] ([phone]) to schedule. Vascular tests ordered by Wound Care Physicians may take up to 2 hours to complete. Please keep that in mind when scheduling. If Vascular testing is scheduled, please bring supplies to replace your dressing after testing is done. The vascular department does not stock supplies. Wound: Coccyx     With each dressing change, rinse wounds with 0.9% Saline. (May use wound wash or soft contact solution. Both can be purchased at a local drug store). If unable to obtain saline, may use a gentle soap and water. Dressing care: Apply silver alginate and duoderm every 3 days and as needed. Stay off bottom/back as much as possible.  Turn side to side and or on stomach through out day. Home Care Agency/Facility: [facility]    Your wound-care supplies will be provided by:   Please note, depending on your insurance coverage, you may have out-of-pocket expenses for these supplies. Someone from the company should call you to confirm your order and discuss those potential costs before they ship your products -- please anticipate that call. If your out-of-pocket cost could be substantial, Many companies have financial hardship programs for patients who qualify, so please ask about that if you might need a hand. If you have any questions about your supplies or your potential out-of-pocket costs, or if you need to place an order for a refill of supplies (typically monthly), please call the company directly. Your  is Christiana Vazquez up with Dr Zuleyma Stover In 1 week(s) in the wound care center. Wound Care Center Information: Should you experience any significant changes in your wound(s) or have questions about your wound care, please contact the [facility] at [phone] Monday  - Thursday 8:00 am - 4:00 pm and Friday 8:00 am - 1:00pm. If you need help with your wound outside these hours and cannot wait until we are again available, contact your PCP or go to the hospital emergency room. PLEASE NOTE: IF YOU ARE UNABLE TO OBTAIN WOUND SUPPLIES, CONTINUE TO USE THE SUPPLIES YOU HAVE AVAILABLE UNTIL YOU ARE ABLE TO REACH US. IT IS MOST IMPORTANT TO KEEP THE WOUND COVERED AT ALL TIMES. Patient Experience    Thank you for trusting us with your care. You may receive a survey from a company called [employer] asking for your feedback. We would appreciate it if you took a few minutes to share your experience. Your input is very valuable to us.

## 2023-01-05 ENCOUNTER — HOSPITAL ENCOUNTER (OUTPATIENT)
Dept: WOUND CARE | Age: 48
Discharge: HOME | End: 2023-01-05
Payer: COMMERCIAL

## 2023-01-05 VITALS
TEMPERATURE: 96.9 F | RESPIRATION RATE: 15 BRPM | HEART RATE: 76 BPM | DIASTOLIC BLOOD PRESSURE: 81 MMHG | SYSTOLIC BLOOD PRESSURE: 123 MMHG

## 2023-01-05 DIAGNOSIS — Z74.09 IMPAIRED MOBILITY: ICD-10-CM

## 2023-01-05 DIAGNOSIS — E08.00 DIABETES MELLITUS DUE TO UNDERLYING CONDITION WITH HYPEROSMOLARITY WITHOUT COMA, UNSPECIFIED WHETHER LONG TERM INSULIN USE (HCC): ICD-10-CM

## 2023-01-05 DIAGNOSIS — L97.512 RIGHT FOOT ULCER, WITH FAT LAYER EXPOSED (HCC): Primary | ICD-10-CM

## 2023-01-05 DIAGNOSIS — L89.153 PRESSURE ULCER OF COCCYGEAL REGION, STAGE 3 (HCC): ICD-10-CM

## 2023-01-05 DIAGNOSIS — E66.01 MORBID OBESITY (HCC): ICD-10-CM

## 2023-01-05 PROCEDURE — 99213 OFFICE O/P EST LOW 20 MIN: CPT

## 2023-01-05 PROCEDURE — 11043 DBRDMT MUSC&/FSCA 1ST 20/<: CPT

## 2023-01-05 RX ORDER — BACITRACIN, NEOMYCIN, POLYMYXIN B 400; 3.5; 5 [USP'U]/G; MG/G; [USP'U]/G
OINTMENT TOPICAL ONCE
OUTPATIENT
Start: 2023-01-05 | End: 2023-01-05

## 2023-01-05 RX ORDER — CLOBETASOL PROPIONATE 0.5 MG/G
OINTMENT TOPICAL ONCE
OUTPATIENT
Start: 2023-01-05 | End: 2023-01-05

## 2023-01-05 RX ORDER — LIDOCAINE HYDROCHLORIDE 20 MG/ML
JELLY TOPICAL ONCE
OUTPATIENT
Start: 2023-01-05 | End: 2023-01-05

## 2023-01-05 RX ORDER — BETAMETHASONE DIPROPIONATE 0.05 %
OINTMENT (GRAM) TOPICAL ONCE
OUTPATIENT
Start: 2023-01-05 | End: 2023-01-05

## 2023-01-05 RX ORDER — GINSENG 100 MG
CAPSULE ORAL ONCE
OUTPATIENT
Start: 2023-01-05 | End: 2023-01-05

## 2023-01-05 RX ORDER — LIDOCAINE 50 MG/G
OINTMENT TOPICAL ONCE
OUTPATIENT
Start: 2023-01-05 | End: 2023-01-05

## 2023-01-05 RX ORDER — LIDOCAINE HYDROCHLORIDE 40 MG/ML
SOLUTION TOPICAL ONCE
OUTPATIENT
Start: 2023-01-05 | End: 2023-01-05

## 2023-01-05 RX ORDER — LIDOCAINE 40 MG/G
CREAM TOPICAL ONCE
OUTPATIENT
Start: 2023-01-05 | End: 2023-01-05

## 2023-01-05 RX ORDER — BACITRACIN ZINC AND POLYMYXIN B SULFATE 500; 1000 [USP'U]/G; [USP'U]/G
OINTMENT TOPICAL ONCE
OUTPATIENT
Start: 2023-01-05 | End: 2023-01-05

## 2023-01-05 RX ORDER — GENTAMICIN SULFATE 1 MG/G
OINTMENT TOPICAL ONCE
OUTPATIENT
Start: 2023-01-05 | End: 2023-01-05

## 2023-01-05 NOTE — PROGRESS NOTES
[address] [facility]   Medical Staff H/P Note     Elvira Salvador 61 RECORD NUMBER:  [id number]  AGE: 41 y.o. GENDER: male  : 1975  EPISODE DATE:  2023    Chief complaint and reason for visit:     Chief Complaint   Patient presents with    Wound Check     Coccxy      HPI: Patient presenting for initial evaluation of wound(s) per chief complaint. New wound to coccyx. Started sometime in 2022 while hospitalized. 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 Now mostly wheelchair bound. No pain or other issues. required ICU stay per wife. Subjective and ROS: Symptoms, wound related issues, or other pertinent wound history since last visit: as above    PMH/SH/surg Hx reviewed. Meds reviewed. Medical Decision Making:     Problem List Items Addressed This Visit          Endocrine    Diabetes mellitus ([address]       Other    Pressure ulcer of coccygeal region, stage 3 (HCC)    Impaired mobility    Morbid obesity ([address]    Right foot ulcer, with fat layer exposed ([address] - Primary       Wounds and Treatment Plan:  Pressure ulcer coccyx stage III. Overlying soft fibrous nonviable tissue. Rolled edges, epiboly. Some maceration. Debridement done. Silver alginate and DuoDERM. Change every 2 to 3 days. Other associated diagnoses or problems addressed:  Impaired mobility. Patient wheelchair-bound. Instructed offloading and pressure relief measures in detail. Education counseling provided. Nutritional support. Protein emphasis with meals. Diabetes mellitus. Discussed with patient in detail regarding tight glycemic control. Education counseling provided. Pertinent imaging reviewed including independent interpretation include:  None    Pertinent labs reviewed. Medical records and review of external note (s) from other providers done as well.     New lab or imaging orders placed:  None     Prescription drug management: N/A     Discussion of management or test interpretation with another provider, other qualified health care professional, and other external source. Comorbid conditions affecting wound healing: As per [facility] which was reviewed. Risk of complications and/or mortality of patient management: This patient has a moderate risk of morbidity and mortality from additional diagnostic testing or treatment. This is due to the above conditions affecting wound healing as well as patient and procedure risk factors. Education and discussion held with patient regarding these disease processes pertinent to wound(s). Other pertinent decisions include: minor surgery or procedures as below. The patient's diagnosis or treatment is not significantly limited by social determinants of health as noted by: N/A . Wound 01/05/23 Coccyx #1 (Active)   Wound Image   01/05/23 1101   Wound Etiology Pressure Stage 3 01/05/23 1101   Wound Cleansed Cleansed with saline 01/05/23 1101   Wound Length (cm) 1.1 cm 01/05/23 1101   Wound Width (cm) 0.4 cm 01/05/23 1101   Wound Depth (cm) 0.3 cm 01/05/23 1101   Wound Surface Area (cm^2) 0.44 cm^2 01/05/23 1101   Wound Volume (cm^3) 0.132 cm^3 01/05/23 1101   Post-Procedure Length (cm) 1.1 cm 01/05/23 1114   Post-Procedure Width (cm) 0.4 cm 01/05/23 1114   Post-Procedure Depth (cm) 0.3 cm 01/05/23 1114   Post-Procedure Surface Area (cm^2) 0.44 cm^2 01/05/23 1114   Post-Procedure Volume (cm^3) 0.132 cm^3 01/05/23 1114   Wound Assessment Bleeding 01/05/23 1114   Drainage Amount Small 01/05/23 1101   Drainage Description Yellow 01/05/23 1101   Odor None 01/05/23 1101   Santa-wound Assessment Maceration 01/05/23 1101   Margins Defined edges 01/05/23 1101   Number of days: 0       Procedures done during this encounter:   Debridement: Excisional Debridement  Indications:  Based on my examination of this patient's wound(s)/ulcer(s) today, debridement is required to promote healing and evaluate the wound base.  Risks and benefits discussed with patient who has agreed to proceed. Performed by: Christina Escobar MD  Consent obtained:  Yes  Time out taken:  Yes  Pain Control: Anesthetic  Anesthetic: 4% Lidocaine Cream   Using curette the wound(s)/ulcer(s) was/were debrided down through and including the removal of subcutaneous tissue. Devitalized Tissue Debrided:  fibrin, biofilm, slough, and necrotic/eschar  Pre Debridement Measurements:  Are located in the [place]  Documentation Flow Sheet  Wound/Ulcer #: 1  Post Debridement Measurements:  Wound/Ulcer Descriptions are Pre Debridement except measurements: Total Surface Area Debrided:  0.44 sq cm   Diabetic/Pressure/Non Pressure Ulcers only:  Ulcer: Pressure ulcer, Stage 3   Estimated Blood Loss:  Minimal  Hemostasis Achieved:  by pressure  Procedural Pain:  0  / 10   Post Procedural Pain:  0 / 10   Response to treatment:  Well tolerated by patient. TIME: E/M Time spent with patient and/or patient care issues: [] 15-20 min  [x] 21-30 min  [] 31-44 min  [] 45 min or more.    This is above the usual time needed to address patient's chief complaint today: [x] Yes  [] No  This time includes physician non-face-to-face service time visit on the date of service such as  Preparing to see the patient (eg, review of tests)  Obtaining and/or reviewing separately obtained history  Performing a medically necessary appropriate examination and/or evaluation  Counseling and educating the patient/family/caregiver  Ordering medications, tests, or procedures  Referring and communicating with other health care professionals as needed  Documenting clinical information in the electronic or other health record  Independently interpreting results (not reported separately) and communicating results to the patient/family/caregiver  Care coordination (not reported separately)    Objective:    /81   Pulse 76   Temp 96.9 °F (36.1 °C) (Infrared)   Resp 15   Wt Readings from Last 3 Encounters:   06/01/12 (!) 384 lb (174.2 kg)   03/26/12 (!) 383 lb (173.7 kg)       PHYSICAL EXAM  General: Alert and in no acute distress. Normal appearing  Skin: Warm and dry, no rash  Head: Normocephalic and atraumatic  Eyes: Extraocular eye movements intact, conjunctivae normal, and sclera anicteric  ENT: Hearing grossly normal bilaterally. Normal appearance  Respiratory: no chest wall tenderness. no respiratory distress  GI: Abdomen non-tender and benign  Musculoskeletal: Baseline range of motion in joints. Nontender calves. No cyanosis. Edema 1+. Neurologic: Speech normal. At baseline without new focal deficits. Mental status normal or at baseline    PAST MEDICAL HISTORY        Diagnosis Date    Diabetes mellitus ([address]     Hypertension     Obesity     SVT (supraventricular tachycardia) ([address]     WPW syndrome        PAST SURGICAL HISTORY    Past Surgical History:   Procedure Laterality Date    ABLATION OF DYSRHYTHMIC FOCUS  4/17/12    posterior, septal accessory pathway    FOOT SURGERY      BILAT INGROWN TOENAILS       FAMILY HISTORY    Family History   Problem Relation Age of Onset    High Blood Pressure Mother     Cancer Maternal Grandmother         bone       SOCIAL HISTORY    Social History     Tobacco Use    Smoking status: Former     Packs/day: 1.00     Years: 15.00     Pack years: 15.00     Types: Cigarettes    Smokeless tobacco: Former     Quit date: 3/8/2012   Substance Use Topics    Alcohol use: No    Drug use: No       ALLERGIES    Allergies   Allergen Reactions    Lactose      Lactose intolerance       MEDICATIONS    Current Outpatient Medications on File Prior to Encounter   Medication Sig Dispense Refill    Dulaglutide (TRULICITY) 1.5 EE/0.6IO SOPN Inject 1.5 mg into the skin once a week      insulin detemir (LEVEMIR) 100 UNIT/ML injection vial Inject into the skin nightly      empagliflozin (JARDIANCE) 25 MG tablet Take 25 mg by mouth daily      lisinopril (PRINIVIL;ZESTRIL) 40 MG tablet Take 1 tablet by mouth daily.  90 tablet 1 metoprolol (LOPRESSOR) 50 MG tablet Take 1 tablet by mouth 2 times daily. 180 tablet 1    simvastatin (ZOCOR) 20 MG tablet Take 1 tablet by mouth nightly. 90 tablet 1    aspirin 81 MG chewable tablet Take 1 tablet by mouth daily. 30 tablet 0     No current facility-administered medications on file prior to encounter. Written patient dismissal instructions given to patient and signed by patient or POA.          Electronically signed by Dianne Del Valle MD on 1/5/2023 at 11:17 AM

## 2023-01-05 NOTE — PLAN OF CARE
[facility]  [address], [address]  Telephone: [phone]        [facility] Fax # [phone]        Discharge Instructions           [facility]  [address], [address]  Telephone: [phone]    Discharge Instructions    Important reminders:    **If you have any signs and symptoms of illness (Cough, fever, congestion, nausea, vomiting, diarrhea, etc.) please call the wound care center prior to your appointment. 1. Increase Protein intake for optimal wound healing  2. No added salt to reduce any swelling  3. If diabetic, maintain good glucose control  4. If you smoke, smoking prohibits wound healing, we ask that you refrain from smoking. 5. When taking antibiotics take the entire prescription as ordered. Do not stop taking until medication is all gone unless otherwise instructed. 6. Exercise as tolerated. 7. Keep weight off wounds and reposition every 2 hours if applicable. 8. If wound(s) is on your lower extremity, elevate legs to the level of the heart or above for 30 minutes 4-5 times a day and/or when sitting. Avoid standing for long periods of time. 9. Do not get wounds wet in bath or shower unless otherwise instructed by your physician. If your wound is on your foot or leg, you may purchase a cast bag. Please ask at the pharmacy. If Vascular testing is ordered, please call [address] [place] [address] [phone]) to schedule. Vascular tests ordered by Wound Care Physicians may take up to 2 hours to complete. Please keep that in mind when scheduling. If Vascular testing is scheduled, please bring supplies to replace your dressing after testing is done. The vascular department does not stock supplies. Wound: Coccyx     With each dressing change, rinse wounds with 0.9% Saline. (May use wound wash or soft contact solution. Both can be purchased at a local drug store).  If unable to obtain saline, may use a gentle soap and water. Dressing care: Apply silver alginate and duoderm every 3 days and as needed. Stay off bottom/back as much as possible. Turn side to side and or on stomach through out day. Home Care Agency/Facility: [facility]    Your wound-care supplies will be provided by:   Please note, depending on your insurance coverage, you may have out-of-pocket expenses for these supplies. Someone from the company should call you to confirm your order and discuss those potential costs before they ship your products -- please anticipate that call. If your out-of-pocket cost could be substantial, Many companies have financial hardship programs for patients who qualify, so please ask about that if you might need a hand. If you have any questions about your supplies or your potential out-of-pocket costs, or if you need to place an order for a refill of supplies (typically monthly), please call the company directly. Your  is Christiana Vazquez up with Dr Ayesha Ivy In 1 week(s) in the wound care center. Wound Care Center Information: Should you experience any significant changes in your wound(s) or have questions about your wound care, please contact the [facility] at [phone] Monday  - Thursday 8:00 am - 4:00 pm and Friday 8:00 am - 1:00pm. If you need help with your wound outside these hours and cannot wait until we are again available, contact your PCP or go to the hospital emergency room. PLEASE NOTE: IF YOU ARE UNABLE TO OBTAIN WOUND SUPPLIES, CONTINUE TO USE THE SUPPLIES YOU HAVE AVAILABLE UNTIL YOU ARE ABLE TO REACH US. IT IS MOST IMPORTANT TO KEEP THE WOUND COVERED AT ALL TIMES. Patient Experience    Thank you for trusting us with your care. You may receive a survey from a company called [employer] asking for your feedback. We would appreciate it if you took a few minutes to share your experience.   Your input is very valuable to us.                Skilled nurse to evaluate and treat for wound care. Change dressing as ordered  once a day on Tuesday and Saturday using clean technique. Patient/Family/caregiver may change dressings as needed as instructed when Home Care unavailable.     WOUNDS REQUIRING DRESSING Changes:     Wound 01/05/23 Coccyx #1 (Active)   Wound Image   01/05/23 1101   Wound Etiology Pressure Stage 3 01/05/23 1101   Wound Cleansed Cleansed with saline 01/05/23 1101   Wound Length (cm) 1.1 cm 01/05/23 1101   Wound Width (cm) 0.4 cm 01/05/23 1101   Wound Depth (cm) 0.3 cm 01/05/23 1101   Wound Surface Area (cm^2) 0.44 cm^2 01/05/23 1101   Wound Volume (cm^3) 0.132 cm^3 01/05/23 1101   Post-Procedure Length (cm) 1.1 cm 01/05/23 1114   Post-Procedure Width (cm) 0.4 cm 01/05/23 1114   Post-Procedure Depth (cm) 0.3 cm 01/05/23 1114   Post-Procedure Surface Area (cm^2) 0.44 cm^2 01/05/23 1114   Post-Procedure Volume (cm^3) 0.132 cm^3 01/05/23 1114   Wound Assessment Bleeding 01/05/23 1114   Drainage Amount Small 01/05/23 1101   Drainage Description Yellow 01/05/23 1101   Odor None 01/05/23 1101   Santa-wound Assessment Maceration 01/05/23 1101   Margins Defined edges 01/05/23 1101   Number of days: 0          Patient seen and treated on 1/5/2023    By Dr Donna Seay, NPI: [id number]   (provider/NPI)

## 2023-01-05 NOTE — PLAN OF CARE
[address] [facility]:      [address] f: [phone] f: [phone] p: [phone] [email or web address]     Ordering Center: [address]  [facility] [address]  [phone]  Dept: [phone]   Fax# [phone]    Patient Information:      Alcario [place]  [address] [facility] [address]   [phone]   : 1975  AGE: 41 y.o. GENDER: male   TODAYS DATE:  2023    Insurance:      PRIMARY INSURANCE:  Plan: BCBS - [place] PPO  Coverage: BCBS  Effective Date: 2020  Group Number: [unfilled]  Subscriber Number: [id number] - ([address] [place] [address])    Payer/Plan Subscr  Sex Relation Sub. Ins. ID Effective Group Num   1.  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    Impaired mobility Z74.09       WOUNDS REQUIRING DRESSING SUPPLIES:     Wound 23 Coccyx #1 (Active)   Wound Image   23 1101   Wound Etiology Pressure Stage 3 23 1101   Wound Cleansed Cleansed with saline 23 1101   Wound Length (cm) 1.1 cm 23 1101   Wound Width (cm) 0.4 cm 23 1101   Wound Depth (cm) 0.3 cm 23 1101   Wound Surface Area (cm^2) 0.44 cm^2 23 1101   Wound Volume (cm^3) 0.132 cm^3 23 1101   Post-Procedure Length (cm) 1.1 cm 23 1114   Post-Procedure Width (cm) 0.4 cm 23 1114   Post-Procedure Depth (cm) 0.3 cm 23 1114   Post-Procedure Surface Area (cm^2) 0.44 cm^2 23 1114 Post-Procedure Volume (cm^3) 0.132 cm^3 01/05/23 1114   Wound Assessment Bleeding 01/05/23 1114   Drainage Amount Small 01/05/23 1101   Drainage Description Yellow 01/05/23 1101   Odor None 01/05/23 1101   Santa-wound Assessment Maceration 01/05/23 1101   Margins Defined edges 01/05/23 1101   Number of days: 0          Supplies Requested :      DISPENSE AS WRITTEN    WOUND #: 1   PRIMARY DRESSING:    Hydrocolloid thin   Cover and Secure with: 4X4 gauze pad     FREQUENCY OF DRESSING CHANGES:  Three times per week    Wound Thickness [x] Full   []Partial             Patient Wound(s) Debrided: [x] Yes   [] No    Debridement Date: 1/5/2023    Debribement Type: Excisional/Sharp    ADDITIONAL ITEMS:  [] Gloves Small  [x] Gloves Medium [] Gloves Large [] Gloves XLarge [] Paper Tape 2\" [] Paper Tape 3\"  [] Medipore Tape 3\" [] Medipore Tape 4\"    [] Hypofix skin sensitive tape 2\"  [] Hypofix skin sensitive tape 4\"  [] Saline  [] Skin Prep   [] Adhesive Remover   [] Cotton Tip Applicators  [] Tubular Stocking   [] Size E  [] Size G  [] Other:    Patient currently being seen by Home Health: [] Yes   [x] No    Quantity of days dispensed:  []15  [x]30  []60  []90 Days    Order valid for 90 days    Provider Information:      PROVIDER'S NAME/NPI  Dr Foster Godoy, NPI: [id number]    I give permission to coordinate the care for this patient

## 2023-01-05 NOTE — PLAN OF CARE
Discharge instructions given. Patient verbalized understanding. Return to [address] [place] [address],[address] in 1 week(s). Called/faxed orders to [facility].

## 2023-01-12 ENCOUNTER — HOSPITAL ENCOUNTER (OUTPATIENT)
Dept: WOUND CARE | Age: 48
Discharge: HOME | End: 2023-01-12